# Patient Record
Sex: MALE | Race: WHITE | Employment: FULL TIME | URBAN - METROPOLITAN AREA
[De-identification: names, ages, dates, MRNs, and addresses within clinical notes are randomized per-mention and may not be internally consistent; named-entity substitution may affect disease eponyms.]

---

## 2023-09-27 ENCOUNTER — OFFICE VISIT (OUTPATIENT)
Dept: ORTHOPEDIC SURGERY | Age: 28
End: 2023-09-27
Payer: COMMERCIAL

## 2023-09-27 VITALS — WEIGHT: 165 LBS | BODY MASS INDEX: 22.35 KG/M2 | HEIGHT: 72 IN

## 2023-09-27 DIAGNOSIS — M84.362A STRESS FRACTURE OF LEFT TIBIA, INITIAL ENCOUNTER: ICD-10-CM

## 2023-09-27 DIAGNOSIS — M79.605 LEFT LEG PAIN: Primary | ICD-10-CM

## 2023-09-27 DIAGNOSIS — M84.364A STRESS FRACTURE OF LEFT FIBULA, INITIAL ENCOUNTER: ICD-10-CM

## 2023-09-27 PROCEDURE — 99203 OFFICE O/P NEW LOW 30 MIN: CPT | Performed by: ORTHOPAEDIC SURGERY

## 2023-09-27 NOTE — PROGRESS NOTES
Name: Krupa Precise  YOB: 1995  Gender: male  MRN: [id number]    CC: Left leg pain    HPI:   June 2023: Noticed left outer leg/ankle pain. Rested for 1 month with resolution of pain  July 2023-August 2023: Returned to running with no pain or limitations until long run 08/2023 August 2023: During a long run he noticed shin pain [different area than before]: No trauma  09/27/2023: Initial visit: Left shin pain    ROS/Meds/PSH/PMH/FH/SH: reviewed today    Tobacco:  reports that he has never smoked. He has never used smokeless tobacco.     Physical Examination:  Patient appears to be alert and oriented with acceptable appearance. No obvious distress or SOB  CV: appears to have acceptable vascular color and capillary refill  Neuro: appears to have mostly intact light touch sensation   Skin: Left with no soft tissue swelling  MS: Standing: Plantigrade: Gait full  Left = mild mid shaft fibula pain  Left = proximal 1/3 tibial metaphyseal area pain  Left = no knee pain; no ankle pain; 5/5 strength; no instability or crepitance    XR: Left: AP lateral tibia/fibula with periosteal changes proximal 1/3 tibia and midshaft fibula consistent with tibial/fibular stress fractures  XR Impression:  As above      Reviewed Test/Records/Documents:   09/11/2023: Left tibia/fibula x-rays [facility]: Radiology impression: There is periosteal reaction involving the proximal metaphysis of the fibula which is highly suggestive of stress fracture    Assessment:    Left proximal 1/3 tibial metaphyseal stress fracture  Left midshaft fibular stress fracture    Plan:   The patient and I discussed the above assessment. We explored treatment options.      At this time, it appears that June 2023 he suffered a fibular stress fracture   He recovered, but during 08/2023 long run, he suffered a proximal tibial stress fracture  He is comfortable self treating with no brace or boot  He feels comfortable resting and understands

## 2023-11-01 ENCOUNTER — OFFICE VISIT (OUTPATIENT)
Dept: ORTHOPEDIC SURGERY | Age: 28
End: 2023-11-01

## 2023-11-01 VITALS — BODY MASS INDEX: 22.35 KG/M2 | WEIGHT: 165 LBS | HEIGHT: 72 IN

## 2023-11-01 DIAGNOSIS — M84.364D STRESS FRACTURE OF LEFT FIBULA WITH ROUTINE HEALING, SUBSEQUENT ENCOUNTER: ICD-10-CM

## 2023-11-01 DIAGNOSIS — M84.362D STRESS FRACTURE OF LEFT TIBIA WITH ROUTINE HEALING, SUBSEQUENT ENCOUNTER: ICD-10-CM

## 2023-11-01 DIAGNOSIS — M79.605 LEFT LEG PAIN: Primary | ICD-10-CM

## 2023-11-01 NOTE — PROGRESS NOTES
Name: Oris Boas  YOB: 1995  Gender: male  MRN: [id number]    11/01/2023: He is doing well. Has been able to walk for exercise with no pain    HPI:   June 2023: Noticed left outer leg/ankle pain. Rested for 1 month with resolution of pain  July 2023-August 2023: Returned to running with no pain or limitations until long run 08/2023 August 2023: During a long run he noticed shin pain [different area than before]: No trauma  09/27/2023: Initial visit: Left shin pain    ROS/Meds/PSH/PMH/FH/SH: reviewed today    Tobacco:  reports that he has never smoked. He has never used smokeless tobacco.     Physical Examination:  Patient appears to be alert and oriented with acceptable appearance. No obvious distress or SOB  CV: appears to have acceptable vascular color and capillary refill  Neuro: appears to have mostly intact light touch sensation   Skin: Left with no soft tissue swelling  MS: Standing: Plantigrade: Gait full  Left = resolved mid shaft fibula pain  Left = much improved/much less proximal 1/3 tibial metaphyseal area pain  Left = no knee pain; no ankle pain; 5/5 strength; no instability or crepitance    XR: Left: AP lateral tibia/fibula taken today with healing proximal 1/3 tibia fracture and healed midshaft fibula fracture  XR Impression:  As above      Reviewed Test/Records/Documents:   09/11/2023: Left tibia/fibula x-rays [facility]: Radiology impression: There is periosteal reaction involving the proximal metaphysis of the fibula which is highly suggestive of stress fracture    Assessment:    Left proximal 1/3 tibial metaphyseal stress fracture  Left midshaft fibular stress fracture    Plan:   The patient and I discussed the above assessment. We explored treatment options.      June 2023 he suffered a fibular stress fracture   He recovered, but during 08/2023 long run, he suffered a proximal tibial stress fracture  He appears to be healing and doing really well  I stressed the